# Patient Record
Sex: FEMALE | Race: WHITE | NOT HISPANIC OR LATINO | Employment: UNEMPLOYED | URBAN - METROPOLITAN AREA
[De-identification: names, ages, dates, MRNs, and addresses within clinical notes are randomized per-mention and may not be internally consistent; named-entity substitution may affect disease eponyms.]

---
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
## 2020-03-05 PROBLEM — I85.00 IDIOPATHIC ESOPHAGEAL VARICES WITHOUT BLEEDING: Status: ACTIVE | Noted: 2020-03-05

## 2020-03-05 PROBLEM — R53.81 OTHER MALAISE AND FATIGUE: Status: ACTIVE | Noted: 2020-03-05

## 2020-03-05 PROBLEM — K76.9 HEPATIC LESION: Status: ACTIVE | Noted: 2020-03-05

## 2020-03-05 PROBLEM — K76.89 HEPATIC FOCAL NODULAR HYPERPLASIA: Status: ACTIVE | Noted: 2017-05-01

## 2020-03-05 PROBLEM — R51.9 BILATERAL HEADACHE: Status: ACTIVE | Noted: 2020-03-05

## 2020-03-05 PROBLEM — N92.0 MENORRHAGIA: Status: ACTIVE | Noted: 2020-03-05

## 2020-03-05 PROBLEM — D69.6 THROMBOCYTOPENIA: Status: ACTIVE | Noted: 2020-03-05

## 2020-03-05 PROBLEM — R53.83 OTHER MALAISE AND FATIGUE: Status: ACTIVE | Noted: 2020-03-05

## 2020-07-09 PROBLEM — N93.9 ABNORMAL UTERINE BLEEDING (AUB): Status: ACTIVE | Noted: 2020-07-09

## 2021-09-07 PROBLEM — F31.9 BIPOLAR DISORDER, UNSPECIFIED: Status: ACTIVE | Noted: 2021-09-07

## 2021-09-29 PROBLEM — F60.3 BORDERLINE PERSONALITY DISORDER IN ADULT: Status: ACTIVE | Noted: 2021-09-29

## 2021-09-29 PROBLEM — F10.20 ALCOHOL USE DISORDER, MODERATE, DEPENDENCE: Status: ACTIVE | Noted: 2021-09-29

## 2021-09-29 PROBLEM — F12.20 CANNABIS USE DISORDER, MODERATE, DEPENDENCE: Status: ACTIVE | Noted: 2021-09-29

## 2021-09-29 PROBLEM — F12.20 CANNABIS USE DISORDER, MODERATE, DEPENDENCE: Status: RESOLVED | Noted: 2021-09-29 | Resolved: 2021-09-29

## 2021-10-04 PROBLEM — Z72.0 TOBACCO USE: Status: ACTIVE | Noted: 2021-10-04

## 2021-10-04 PROBLEM — I45.5 SINUS PAUSE: Status: ACTIVE | Noted: 2021-10-04

## 2021-10-04 PROBLEM — I44.30 HEART BLOCK ATRIOVENTRICULAR: Status: ACTIVE | Noted: 2021-10-04

## 2021-10-04 PROBLEM — R10.9 INTRACTABLE ABDOMINAL PAIN: Status: ACTIVE | Noted: 2021-10-04

## 2021-10-04 PROBLEM — K52.9 COLITIS: Status: ACTIVE | Noted: 2021-10-04

## 2021-10-04 PROBLEM — N12 PYELONEPHRITIS: Status: ACTIVE | Noted: 2021-10-04

## 2021-10-04 PROBLEM — J45.21 MILD INTERMITTENT ASTHMA WITH ACUTE EXACERBATION: Status: ACTIVE | Noted: 2021-10-04

## 2021-10-22 PROBLEM — R00.2 PALPITATIONS: Status: ACTIVE | Noted: 2021-10-22

## 2021-10-22 PROBLEM — R42 DIZZINESS: Status: ACTIVE | Noted: 2021-10-22

## 2021-10-22 PROBLEM — R42 DIZZINESS: Chronic | Status: ACTIVE | Noted: 2021-10-22

## 2021-10-22 PROBLEM — R00.2 PALPITATIONS: Chronic | Status: ACTIVE | Noted: 2021-10-22

## 2021-11-03 PROBLEM — F31.81 BIPOLAR 2 DISORDER: Status: ACTIVE | Noted: 2021-09-07

## 2021-11-03 PROBLEM — R46.81 OBSESSIVE-COMPULSIVE BEHAVIOR: Status: ACTIVE | Noted: 2021-11-03

## 2021-11-17 PROBLEM — G47.00 INSOMNIA: Status: ACTIVE | Noted: 2021-11-17

## 2021-11-17 PROBLEM — Z91.199 NON-ADHERENCE TO MEDICAL TREATMENT: Status: ACTIVE | Noted: 2021-11-17

## 2021-11-24 PROBLEM — Z91.199 NON-ADHERENCE TO MEDICAL TREATMENT: Chronic | Status: ACTIVE | Noted: 2021-11-17

## 2021-11-24 PROBLEM — F60.3 BORDERLINE PERSONALITY DISORDER IN ADULT: Chronic | Status: ACTIVE | Noted: 2021-09-29

## 2021-11-24 PROBLEM — R46.81 OBSESSIVE-COMPULSIVE BEHAVIOR: Chronic | Status: ACTIVE | Noted: 2021-11-03

## 2021-11-24 PROBLEM — F10.20 ALCOHOL USE DISORDER, MODERATE, DEPENDENCE: Chronic | Status: ACTIVE | Noted: 2021-09-29

## 2021-11-24 PROBLEM — F31.81 BIPOLAR 2 DISORDER: Chronic | Status: ACTIVE | Noted: 2021-09-07

## 2021-12-08 PROBLEM — Z72.0 TOBACCO USE: Chronic | Status: ACTIVE | Noted: 2021-10-04

## 2021-12-08 PROBLEM — G47.00 INSOMNIA: Chronic | Status: ACTIVE | Noted: 2021-11-17

## 2022-06-01 ENCOUNTER — TELEPHONE (OUTPATIENT)
Dept: SMOKING CESSATION | Facility: CLINIC | Age: 22
End: 2022-06-01
Payer: MEDICAID

## 2022-06-01 ENCOUNTER — CLINICAL SUPPORT (OUTPATIENT)
Dept: SMOKING CESSATION | Facility: CLINIC | Age: 22
End: 2022-06-01

## 2022-06-01 DIAGNOSIS — F17.200 NICOTINE DEPENDENCE: Primary | ICD-10-CM

## 2022-06-01 PROCEDURE — 99404 PREV MED CNSL INDIV APPRX 60: CPT | Mod: 95,,, | Performed by: GENERAL PRACTICE

## 2022-06-01 PROCEDURE — 99404 PR PREVENT COUNSEL,INDIV,60 MIN: ICD-10-PCS | Mod: 95,,, | Performed by: GENERAL PRACTICE

## 2022-06-01 RX ORDER — IBUPROFEN 200 MG
1 TABLET ORAL DAILY
Qty: 14 PATCH | Refills: 0 | Status: SHIPPED | OUTPATIENT
Start: 2022-06-01 | End: 2022-12-08

## 2022-06-01 NOTE — TELEPHONE ENCOUNTER
Spoke to patient over the phone in regard to her virtual intake appointment today. Reviewed how to join her virtual visit and provided patient with return contact information if needed.

## 2022-06-01 NOTE — Clinical Note
Patient intake for Quit 1 Episode conducted by virtual visit.  Patient will be participating in bi-weekly tobacco cessation meetings and will begin the prescribed tobacco cessation medication regimen of 21 mg nicotine patch QD. FTND score of 6 indicates a moderate to high dependence on nicotine. YU-D score of 48 is perceived as significant distress / probable depression at this time. Patient sees Dr. Cochran with psychiatry. Advised patient to schedule an appointment with her physician. Will monitor.

## 2022-06-02 PROBLEM — R45.851 SUICIDAL IDEATION: Status: ACTIVE | Noted: 2022-06-02

## 2022-06-02 PROBLEM — F17.210 CIGARETTE NICOTINE DEPENDENCE WITHOUT COMPLICATION: Status: ACTIVE | Noted: 2021-10-04

## 2022-06-02 PROBLEM — J45.40 MODERATE PERSISTENT ASTHMA WITHOUT COMPLICATION: Status: ACTIVE | Noted: 2022-06-02

## 2022-06-03 PROBLEM — F31.4 BIPOLAR DISORD, CRNT EPSD DEPRESS, SEV, W/O PSYCH FEATURES: Status: ACTIVE | Noted: 2022-06-03

## 2022-06-09 PROBLEM — F17.200 TOBACCO USE DISORDER: Status: ACTIVE | Noted: 2022-06-09

## 2022-07-06 ENCOUNTER — TELEPHONE (OUTPATIENT)
Dept: SMOKING CESSATION | Facility: CLINIC | Age: 22
End: 2022-07-06
Payer: MEDICAID

## 2022-07-06 NOTE — TELEPHONE ENCOUNTER
Attempted to contact patient in regard to missed virtual appointment. Left recorded message with return contact information.

## 2022-07-18 DIAGNOSIS — F17.200 NICOTINE DEPENDENCE: ICD-10-CM

## 2022-07-18 RX ORDER — IBUPROFEN 200 MG
1 TABLET ORAL DAILY
Qty: 14 PATCH | Refills: 0 | OUTPATIENT
Start: 2022-07-18

## 2022-08-02 ENCOUNTER — TELEPHONE (OUTPATIENT)
Dept: SMOKING CESSATION | Facility: CLINIC | Age: 22
End: 2022-08-02
Payer: MEDICAID

## 2022-08-02 NOTE — TELEPHONE ENCOUNTER
Attempted to contact patient in regard to missed virtual appointment. Telephone number on file is no longer in service. Unable to reach patient.

## 2022-09-21 PROBLEM — F41.1 GAD (GENERALIZED ANXIETY DISORDER): Status: ACTIVE | Noted: 2022-09-21

## 2022-09-21 PROBLEM — F43.10 PTSD (POST-TRAUMATIC STRESS DISORDER): Status: ACTIVE | Noted: 2022-09-21

## 2022-09-24 PROBLEM — F31.81 BIPOLAR 2 DISORDER: Status: RESOLVED | Noted: 2021-09-07 | Resolved: 2022-09-24

## 2022-11-10 ENCOUNTER — SOCIAL WORK (OUTPATIENT)
Dept: ADMINISTRATIVE | Facility: OTHER | Age: 22
End: 2022-11-10
Payer: MEDICAID

## 2022-11-10 PROBLEM — O20.9 VAGINAL BLEEDING IN PREGNANCY, FIRST TRIMESTER: Status: ACTIVE | Noted: 2022-11-10

## 2022-11-10 NOTE — PROGRESS NOTES
SW completed pt's notification of pregnancy and faxed/scanned into epic. No other needs identified at this time.     Lindsey Lester,MSW  Pager#[phone]

## 2022-11-12 PROBLEM — K76.9 HEPATIC LESION: Status: RESOLVED | Noted: 2020-03-05 | Resolved: 2022-11-12

## 2022-11-12 PROBLEM — R42 DIZZINESS: Status: RESOLVED | Noted: 2021-10-22 | Resolved: 2022-11-12

## 2022-11-12 PROBLEM — R10.9 INTRACTABLE ABDOMINAL PAIN: Status: RESOLVED | Noted: 2021-10-04 | Resolved: 2022-11-12

## 2022-11-12 PROBLEM — N12 PYELONEPHRITIS: Status: RESOLVED | Noted: 2021-10-04 | Resolved: 2022-11-12

## 2022-11-12 PROBLEM — R51.9 BILATERAL HEADACHE: Status: RESOLVED | Noted: 2020-03-05 | Resolved: 2022-11-12

## 2022-11-12 PROBLEM — R53.83 OTHER MALAISE AND FATIGUE: Status: RESOLVED | Noted: 2020-03-05 | Resolved: 2022-11-12

## 2022-11-12 PROBLEM — N92.0 MENORRHAGIA: Status: RESOLVED | Noted: 2020-03-05 | Resolved: 2022-11-12

## 2022-11-12 PROBLEM — R53.81 OTHER MALAISE AND FATIGUE: Status: RESOLVED | Noted: 2020-03-05 | Resolved: 2022-11-12

## 2022-11-12 PROBLEM — J45.21 MILD INTERMITTENT ASTHMA WITH ACUTE EXACERBATION: Status: RESOLVED | Noted: 2021-10-04 | Resolved: 2022-11-12

## 2022-11-12 PROBLEM — R45.851 SUICIDAL IDEATION: Status: RESOLVED | Noted: 2022-06-02 | Resolved: 2022-11-12

## 2022-11-12 PROBLEM — R00.2 PALPITATIONS: Status: RESOLVED | Noted: 2021-10-22 | Resolved: 2022-11-12

## 2022-11-28 PROBLEM — O99.341 BIPOLAR DISEASE DURING PREGNANCY IN FIRST TRIMESTER: Status: ACTIVE | Noted: 2022-11-28

## 2022-11-28 PROBLEM — O99.211 OBESITY AFFECTING PREGNANCY IN FIRST TRIMESTER: Status: ACTIVE | Noted: 2022-11-28

## 2022-11-28 PROBLEM — O99.411 CARDIAC DISEASE DURING PREGNANCY IN FIRST TRIMESTER: Status: ACTIVE | Noted: 2022-11-28

## 2022-11-28 PROBLEM — O09.91 SUPERVISION OF HIGH-RISK PREGNANCY, FIRST TRIMESTER: Status: ACTIVE | Noted: 2022-11-28

## 2022-11-28 PROBLEM — F31.9 BIPOLAR DISEASE DURING PREGNANCY IN FIRST TRIMESTER: Status: ACTIVE | Noted: 2022-11-28

## 2022-12-08 PROBLEM — D50.9 IDA (IRON DEFICIENCY ANEMIA): Status: ACTIVE | Noted: 2022-12-08

## 2023-01-04 ENCOUNTER — CLINICAL SUPPORT (OUTPATIENT)
Dept: SMOKING CESSATION | Facility: CLINIC | Age: 23
End: 2023-01-04

## 2023-01-04 DIAGNOSIS — F17.200 NICOTINE DEPENDENCE: Primary | ICD-10-CM

## 2023-01-04 PROCEDURE — 99407 PR TOBACCO USE CESSATION INTENSIVE >10 MINUTES: ICD-10-PCS | Mod: S$GLB,,, | Performed by: GENERAL PRACTICE

## 2023-01-04 PROCEDURE — 99407 BEHAV CHNG SMOKING > 10 MIN: CPT | Mod: S$GLB,,, | Performed by: GENERAL PRACTICE

## 2023-01-17 ENCOUNTER — SOCIAL WORK (OUTPATIENT)
Dept: ADMINISTRATIVE | Facility: OTHER | Age: 23
End: 2023-01-17
Payer: MEDICAID

## 2023-01-17 NOTE — PROGRESS NOTES
JUNI received message from  regarding a prior authorization for pt's medication(Lovenox). JUNI completed demographic sheet of the prior authorization form and gave the form to  for review/signature.JUNI later received pt's completed prior authorization form from  and fax to([phone])[facility]. No other needs identified at this time    MARIA ELENA Morton  Pager#:[phone]

## 2023-01-20 ENCOUNTER — SOCIAL WORK (OUTPATIENT)
Dept: ADMINISTRATIVE | Facility: OTHER | Age: 23
End: 2023-01-20
Payer: MEDICAID

## 2023-01-20 NOTE — PROGRESS NOTES
JUNI received approval letter from pt's insurance for medication(Enoxaparin) and has been approved from 01/18/2023 to 01/18/2023. JUNI made phone call to the outpatient discharge [phone]) informed them that pt medication has been approved. The outpatient discharge pharmacy representative ran it the claim but did not get the approved;per pharmacy representative they will check with the insurance provider and will call JUNI back.  JUNI scanned the approval letter into epic. JUNI will continue to follow up..    Lindsey LesterMSW  Pager#[phone]

## 2023-01-20 NOTE — PROGRESS NOTES
JUNI received phone call from the outpatient pharmacy([phone])regarding pt's medication(Lovenox);per pharmacy representative a prior authorization need to be re done because of the quantity should not be 36.mL but 12 mL. JUNI informed MD regarding  a new prior authorization need to be done with the quantity as 12 ML. JUNI completed the demographic sheet/attached clinical note on pt's prior authorization paperwork and gave the paperwork to MD for review/signature. JUNI later received pt's completed prior authorization paperwork from MD and fax to([phone])[employer]. No other needs identified at this time.    Lindsey Lester,MSW  Pager#[phone]

## 2023-01-31 PROBLEM — O16.2 ELEVATED BLOOD PRESSURE AFFECTING PREGNANCY IN SECOND TRIMESTER, ANTEPARTUM: Status: ACTIVE | Noted: 2023-01-31

## 2023-02-24 ENCOUNTER — PATIENT MESSAGE (OUTPATIENT)
Dept: RESEARCH | Facility: HOSPITAL | Age: 23
End: 2023-02-24
Payer: MEDICAID

## 2023-03-01 PROBLEM — O10.919 CHRONIC HYPERTENSION AFFECTING PREGNANCY: Status: ACTIVE | Noted: 2023-01-31

## 2023-03-01 PROBLEM — O99.342 BIPOLAR DISEASE DURING PREGNANCY IN SECOND TRIMESTER: Status: ACTIVE | Noted: 2022-11-28

## 2023-03-01 PROBLEM — O34.32: Status: ACTIVE | Noted: 2023-03-01

## 2023-03-01 PROBLEM — O46.90 VAGINAL BLEEDING DURING PREGNANCY: Status: ACTIVE | Noted: 2023-03-01

## 2023-03-03 PROBLEM — O12.12 PROTEINURIA AFFECTING PREGNANCY IN SECOND TRIMESTER: Status: ACTIVE | Noted: 2023-03-03

## 2023-03-07 PROBLEM — O09.92 SUPERVISION OF HIGH RISK PREGNANCY IN SECOND TRIMESTER: Status: ACTIVE | Noted: 2022-11-28

## 2023-03-08 PROBLEM — O32.0XX0 UNSTABLE LIE OF FETUS: Status: ACTIVE | Noted: 2023-03-08

## 2023-03-16 PROBLEM — O09.92 SUPERVISION OF HIGH RISK PREGNANCY IN SECOND TRIMESTER: Status: RESOLVED | Noted: 2022-11-28 | Resolved: 2023-03-16

## 2023-03-16 PROBLEM — O46.90 VAGINAL BLEEDING DURING PREGNANCY: Status: RESOLVED | Noted: 2023-03-01 | Resolved: 2023-03-16

## 2023-03-21 ENCOUNTER — NURSE TRIAGE (OUTPATIENT)
Dept: ADMINISTRATIVE | Facility: CLINIC | Age: 23
End: 2023-03-21
Payer: MEDICAID

## 2023-03-22 NOTE — TELEPHONE ENCOUNTER
"Pt reports had a emergency  section 5 days ago, now feeling a 'ripping sensation' on the Rt side of her incision, but unable to see the incision as she still has the bandage in place, rates the pain a "9" on a 0-10 pain scale. Pt advised to go to the ED now per protocol, Pt encouraged to call back with any worsening symptoms or questions and verbalized understanding.    Reason for Disposition   Severe pain in the incision    Additional Information   Negative: [1] Wound gaping open AND [2] visible internal organs   Negative: Sounds like a life-threatening emergency to the triager   Negative: [1] Bleeding from incision AND [2] won't stop after 10 minutes of direct pressure (using correct technique)   Negative: [1] Widespread rash AND [2] bright red, sunburn-like   Negative: Fever > 100.4 F (38.0 C)    Protocols used: Postpartum -  Incision Symptoms-A-AH    "

## 2023-05-08 PROBLEM — Z97.5 IUD (INTRAUTERINE DEVICE) IN PLACE: Status: ACTIVE | Noted: 2023-05-08

## 2023-06-01 ENCOUNTER — CLINICAL SUPPORT (OUTPATIENT)
Dept: SMOKING CESSATION | Facility: CLINIC | Age: 23
End: 2023-06-01

## 2023-06-01 DIAGNOSIS — F17.200 NICOTINE DEPENDENCE, UNCOMPLICATED: Primary | ICD-10-CM

## 2023-06-01 PROCEDURE — 99999 PR PBB SHADOW E&M-EST. PATIENT-LVL I: ICD-10-PCS | Mod: PBBFAC,,,

## 2023-06-01 PROCEDURE — 99999 PR PBB SHADOW E&M-EST. PATIENT-LVL I: CPT | Mod: PBBFAC,,,

## 2023-06-01 PROCEDURE — 99407 PR TOBACCO USE CESSATION INTENSIVE >10 MINUTES: ICD-10-PCS | Mod: S$GLB,,, | Performed by: GENERAL PRACTICE

## 2023-06-01 PROCEDURE — 99407 BEHAV CHNG SMOKING > 10 MIN: CPT | Mod: S$GLB,,, | Performed by: GENERAL PRACTICE

## 2023-06-01 NOTE — PROGRESS NOTES
Spoke with patient today in regard to smoking cessation progress for 12 month follow up. She states she is not tobacco free. Patient stated she lost her child and is not ready to give up vaping. Recommended PT speak to a counselor. Educated PT on different devices and their chemicals effects on the body. Patient is not interested in scheduling an appointment to return to the program at this time. Informed patient of benefit period, future follow ups and contact information if any further help is needed. Will resolve smart form for 12 month follow up for Quit # 1.

## 2023-06-17 ENCOUNTER — NURSE TRIAGE (OUTPATIENT)
Dept: ADMINISTRATIVE | Facility: CLINIC | Age: 23
End: 2023-06-17
Payer: MEDICAID

## 2023-06-17 NOTE — TELEPHONE ENCOUNTER
"Pt calls stating that her IUD strings are completely outside of her vagina. She also notes heavy vaginal bleeding with clots. She states that she has a "soft cervix" and questions whether or not she should pull the IUD out herself. NT advises pt not to remove IUD. Pt answers triage questions based upon her symptoms.    Care Advice recommends that pt go to ED now. Pt verbalizes understanding and is instructed to call back with any new/worsening sxs, questions, or concerns.   Reason for Disposition   SEVERE vaginal bleeding (e.g., soaking 2 pads or tampons per hour and present 2 or more hours; 1 menstrual cup every 2 hours)    Additional Information   Negative: Shock suspected (e.g., cold/pale/clammy skin, too weak to stand, low BP, rapid pulse)   Negative: Sounds like a life-threatening emergency to the triager   Negative: [1] SEVERE abdominal pain (e.g., excruciating) AND [2] present > 1 hour    Protocols used: Contraception - IUD Symptoms and Tgbbeibry-H-UU    "

## 2023-07-10 PROBLEM — I10 ESSENTIAL HYPERTENSION: Status: ACTIVE | Noted: 2023-07-10

## 2023-07-10 PROBLEM — O99.211 OBESITY AFFECTING PREGNANCY IN FIRST TRIMESTER: Status: RESOLVED | Noted: 2022-11-28 | Resolved: 2023-07-10

## 2023-07-10 PROBLEM — F31.9 BIPOLAR DISEASE DURING PREGNANCY IN SECOND TRIMESTER: Status: RESOLVED | Noted: 2022-11-28 | Resolved: 2023-07-10

## 2023-07-10 PROBLEM — O12.12 PROTEINURIA AFFECTING PREGNANCY IN SECOND TRIMESTER: Status: RESOLVED | Noted: 2023-03-03 | Resolved: 2023-07-10

## 2023-07-10 PROBLEM — Z97.5 NEXPLANON IN PLACE: Status: ACTIVE | Noted: 2023-07-10

## 2023-07-10 PROBLEM — O99.411 CARDIAC DISEASE DURING PREGNANCY IN FIRST TRIMESTER: Status: RESOLVED | Noted: 2022-11-28 | Resolved: 2023-07-10

## 2023-07-10 PROBLEM — O32.0XX0 UNSTABLE LIE OF FETUS: Status: RESOLVED | Noted: 2023-03-08 | Resolved: 2023-07-10

## 2023-07-10 PROBLEM — O99.342 BIPOLAR DISEASE DURING PREGNANCY IN SECOND TRIMESTER: Status: RESOLVED | Noted: 2022-11-28 | Resolved: 2023-07-10

## 2023-07-10 PROBLEM — O34.32: Status: RESOLVED | Noted: 2023-03-01 | Resolved: 2023-07-10

## 2023-07-10 PROBLEM — Z97.5 IUD (INTRAUTERINE DEVICE) IN PLACE: Status: RESOLVED | Noted: 2023-05-08 | Resolved: 2023-07-10

## 2023-07-10 PROBLEM — O20.9 VAGINAL BLEEDING IN PREGNANCY, FIRST TRIMESTER: Status: RESOLVED | Noted: 2022-11-10 | Resolved: 2023-07-10

## 2023-07-10 PROBLEM — O10.919 CHRONIC HYPERTENSION AFFECTING PREGNANCY: Status: RESOLVED | Noted: 2023-01-31 | Resolved: 2023-07-10
